# Patient Record
Sex: MALE | Race: WHITE | Employment: OTHER | URBAN - METROPOLITAN AREA
[De-identification: names, ages, dates, MRNs, and addresses within clinical notes are randomized per-mention and may not be internally consistent; named-entity substitution may affect disease eponyms.]

---

## 2017-01-01 ENCOUNTER — HOSPITAL ENCOUNTER (OUTPATIENT)
Facility: HOSPITAL | Age: 67
Setting detail: HOSPITAL OUTPATIENT SURGERY
Discharge: HOME | End: 2017-01-01
Attending: INTERNAL MEDICINE | Admitting: INTERNAL MEDICINE
Payer: MEDICARE

## 2017-01-01 ENCOUNTER — OFFICE VISIT (OUTPATIENT)
Dept: INTERVENTIONAL RADIOLOGY/VASCULAR | Facility: HOSPITAL | Age: 67
End: 2017-01-01
Attending: RADIOLOGY
Payer: MEDICARE

## 2017-01-01 ENCOUNTER — HOSPITAL ENCOUNTER (OUTPATIENT)
Dept: INTERVENTIONAL RADIOLOGY/VASCULAR | Facility: HOSPITAL | Age: 67
Discharge: HOME | End: 2017-01-01
Attending: RADIOLOGY | Admitting: RADIOLOGY
Payer: MEDICARE

## 2017-01-01 ENCOUNTER — APPOINTMENT (OUTPATIENT)
Dept: SURGERY | Facility: CLINIC | Age: 67
End: 2017-01-01

## 2017-01-01 ENCOUNTER — HOSPITAL ENCOUNTER (OUTPATIENT)
Dept: CT IMAGING | Facility: HOSPITAL | Age: 67
Discharge: HOME | End: 2017-01-01
Attending: INTERNAL MEDICINE
Payer: MEDICARE

## 2017-01-01 ENCOUNTER — OFFICE VISIT (OUTPATIENT)
Dept: SURGERY | Facility: CLINIC | Age: 67
End: 2017-01-01

## 2017-01-01 ENCOUNTER — HOSPITAL ENCOUNTER (OUTPATIENT)
Dept: CT IMAGING | Facility: HOSPITAL | Age: 67
Discharge: HOME | End: 2017-01-01
Attending: RADIOLOGY
Payer: MEDICARE

## 2017-01-01 ENCOUNTER — ANESTHESIA (OUTPATIENT)
Dept: INTERVENTIONAL RADIOLOGY/VASCULAR | Facility: HOSPITAL | Age: 67
End: 2017-01-01
Payer: MEDICARE

## 2017-01-01 ENCOUNTER — TELEPHONE (OUTPATIENT)
Dept: SURGERY | Facility: CLINIC | Age: 67
End: 2017-01-01

## 2017-01-01 ENCOUNTER — SURGERY (OUTPATIENT)
Age: 67
End: 2017-01-01

## 2017-01-01 ENCOUNTER — HOSPITAL ENCOUNTER (OUTPATIENT)
Dept: CT IMAGING | Facility: HOSPITAL | Age: 67
Discharge: HOME | End: 2017-01-01
Attending: CLINICAL NURSE SPECIALIST
Payer: MEDICARE

## 2017-01-01 ENCOUNTER — HOSPITAL ENCOUNTER (OUTPATIENT)
Dept: NUCLEAR MEDICINE | Facility: HOSPITAL | Age: 67
Discharge: HOME | End: 2017-01-01
Attending: RADIOLOGY
Payer: MEDICARE

## 2017-01-01 ENCOUNTER — LAB ENCOUNTER (OUTPATIENT)
Dept: LAB | Facility: HOSPITAL | Age: 67
End: 2017-01-01
Attending: RADIOLOGY
Payer: MEDICARE

## 2017-01-01 VITALS
DIASTOLIC BLOOD PRESSURE: 106 MMHG | WEIGHT: 165 LBS | BODY MASS INDEX: 25.9 KG/M2 | SYSTOLIC BLOOD PRESSURE: 148 MMHG | TEMPERATURE: 98 F | HEART RATE: 58 BPM | HEIGHT: 67 IN

## 2017-01-01 VITALS
RESPIRATION RATE: 20 BRPM | OXYGEN SATURATION: 93 % | BODY MASS INDEX: 25.9 KG/M2 | SYSTOLIC BLOOD PRESSURE: 164 MMHG | TEMPERATURE: 98 F | WEIGHT: 165 LBS | DIASTOLIC BLOOD PRESSURE: 100 MMHG | HEIGHT: 67 IN | HEART RATE: 74 BPM

## 2017-01-01 VITALS
SYSTOLIC BLOOD PRESSURE: 128 MMHG | HEART RATE: 80 BPM | OXYGEN SATURATION: 92 % | DIASTOLIC BLOOD PRESSURE: 77 MMHG | RESPIRATION RATE: 22 BRPM

## 2017-01-01 VITALS
RESPIRATION RATE: 21 BRPM | OXYGEN SATURATION: 92 % | DIASTOLIC BLOOD PRESSURE: 60 MMHG | SYSTOLIC BLOOD PRESSURE: 126 MMHG | TEMPERATURE: 98 F | HEART RATE: 70 BPM

## 2017-01-01 DIAGNOSIS — I85.00 IDIOPATHIC ESOPHAGEAL VARICES WITHOUT BLEEDING (HCC): ICD-10-CM

## 2017-01-01 DIAGNOSIS — C22.0 HEPATOCELLULAR CARCINOMA (HCC): Primary | ICD-10-CM

## 2017-01-01 DIAGNOSIS — C22.0 HEPATOCELLULAR CARCINOMA (HCC): ICD-10-CM

## 2017-01-01 DIAGNOSIS — K74.60 CIRRHOSIS OF LIVER WITHOUT ASCITES, UNSPECIFIED HEPATIC CIRRHOSIS TYPE (HCC): Primary | ICD-10-CM

## 2017-01-01 LAB
AFP-TM SERPL-MCNC: 120.5 NG/ML (ref 0–8.9)
AFP-TM SERPL-MCNC: 165.8 NG/ML (ref 0–8.9)
ALBUMIN SERPL BCP-MCNC: 2.7 G/DL (ref 3.5–4.8)
ALBUMIN SERPL BCP-MCNC: 2.8 G/DL (ref 3.5–4.8)
ALBUMIN/GLOB SERPL: 0.5 {RATIO} (ref 1–2)
ALBUMIN/GLOB SERPL: 0.6 {RATIO} (ref 1–2)
ALP SERPL-CCNC: 107 U/L (ref 32–100)
ALP SERPL-CCNC: 80 U/L (ref 32–100)
ALT SERPL-CCNC: 49 U/L (ref 17–63)
ALT SERPL-CCNC: 51 U/L (ref 17–63)
ANION GAP SERPL CALC-SCNC: 8 MMOL/L (ref 0–18)
ANION GAP SERPL CALC-SCNC: 8 MMOL/L (ref 0–18)
AST SERPL-CCNC: 62 U/L (ref 15–41)
AST SERPL-CCNC: 83 U/L (ref 15–41)
BASOPHILS # BLD: 0 K/UL (ref 0–0.2)
BASOPHILS # BLD: 0 K/UL (ref 0–0.2)
BASOPHILS NFR BLD: 0 %
BASOPHILS NFR BLD: 1 %
BILIRUB SERPL-MCNC: 1.3 MG/DL (ref 0.3–1.2)
BILIRUB SERPL-MCNC: 1.4 MG/DL (ref 0.3–1.2)
BUN SERPL-MCNC: 13 MG/DL (ref 8–20)
BUN SERPL-MCNC: 15 MG/DL (ref 8–20)
BUN/CREAT SERPL: 21.3 (ref 10–20)
BUN/CREAT SERPL: 25 (ref 10–20)
CALCIUM SERPL-MCNC: 8.7 MG/DL (ref 8.5–10.5)
CALCIUM SERPL-MCNC: 8.8 MG/DL (ref 8.5–10.5)
CHLORIDE SERPL-SCNC: 101 MMOL/L (ref 95–110)
CHLORIDE SERPL-SCNC: 105 MMOL/L (ref 95–110)
CO2 SERPL-SCNC: 27 MMOL/L (ref 22–32)
CO2 SERPL-SCNC: 29 MMOL/L (ref 22–32)
CREAT BLD-MCNC: 0.6 MG/DL (ref 0.5–1.5)
CREAT SERPL-MCNC: 0.6 MG/DL (ref 0.5–1.5)
CREAT SERPL-MCNC: 0.61 MG/DL (ref 0.5–1.5)
EOSINOPHIL # BLD: 0 K/UL (ref 0–0.7)
EOSINOPHIL # BLD: 0.1 K/UL (ref 0–0.7)
EOSINOPHIL NFR BLD: 1 %
EOSINOPHIL NFR BLD: 2 %
ERYTHROCYTE [DISTWIDTH] IN BLOOD BY AUTOMATED COUNT: 13 % (ref 11–15)
ERYTHROCYTE [DISTWIDTH] IN BLOOD BY AUTOMATED COUNT: 13.4 % (ref 11–15)
GLOBULIN PLAS-MCNC: 4.7 G/DL (ref 2.5–3.7)
GLOBULIN PLAS-MCNC: 5.1 G/DL (ref 2.5–3.7)
GLUCOSE SERPL-MCNC: 108 MG/DL (ref 70–99)
GLUCOSE SERPL-MCNC: 89 MG/DL (ref 70–99)
HCT VFR BLD AUTO: 43.5 % (ref 41–52)
HCT VFR BLD AUTO: 47.1 % (ref 41–52)
HGB BLD-MCNC: 15.2 G/DL (ref 13.5–17.5)
HGB BLD-MCNC: 16.1 G/DL (ref 13.5–17.5)
INR BLD: 1.1 (ref 0.9–1.2)
LYMPHOCYTES # BLD: 0.2 K/UL (ref 1–4)
LYMPHOCYTES # BLD: 0.7 K/UL (ref 1–4)
LYMPHOCYTES NFR BLD: 20 %
LYMPHOCYTES NFR BLD: 7 %
MCH RBC QN AUTO: 33.9 PG (ref 27–32)
MCH RBC QN AUTO: 34.1 PG (ref 27–32)
MCHC RBC AUTO-ENTMCNC: 34.2 G/DL (ref 32–37)
MCHC RBC AUTO-ENTMCNC: 34.8 G/DL (ref 32–37)
MCV RBC AUTO: 98 FL (ref 80–100)
MCV RBC AUTO: 99.1 FL (ref 80–100)
MONOCYTES # BLD: 0.4 K/UL (ref 0–1)
MONOCYTES # BLD: 0.4 K/UL (ref 0–1)
MONOCYTES NFR BLD: 11 %
MONOCYTES NFR BLD: 12 %
NEUTROPHILS # BLD AUTO: 2.3 K/UL (ref 1.8–7.7)
NEUTROPHILS # BLD AUTO: 3 K/UL (ref 1.8–7.7)
NEUTROPHILS NFR BLD: 68 %
NEUTROPHILS NFR BLD: 79 %
OSMOLALITY UR CALC.SUM OF ELEC: 286 MOSM/KG (ref 275–295)
OSMOLALITY UR CALC.SUM OF ELEC: 291 MOSM/KG (ref 275–295)
PLATELET # BLD AUTO: 72 K/UL (ref 140–400)
PLATELET # BLD AUTO: 75 K/UL (ref 140–400)
PLATELET # BLD AUTO: 87 K/UL (ref 140–400)
PMV BLD AUTO: 10.9 FL (ref 7.4–10.3)
PMV BLD AUTO: 10.9 FL (ref 7.4–10.3)
POTASSIUM SERPL-SCNC: 3.5 MMOL/L (ref 3.3–5.1)
POTASSIUM SERPL-SCNC: 4.3 MMOL/L (ref 3.3–5.1)
PROT SERPL-MCNC: 7.4 G/DL (ref 5.9–8.4)
PROT SERPL-MCNC: 7.9 G/DL (ref 5.9–8.4)
PROTHROMBIN TIME: 14 SECONDS (ref 11.8–14.5)
RBC # BLD AUTO: 4.44 M/UL (ref 4.5–5.9)
RBC # BLD AUTO: 4.76 M/UL (ref 4.5–5.9)
SODIUM SERPL-SCNC: 138 MMOL/L (ref 136–144)
SODIUM SERPL-SCNC: 140 MMOL/L (ref 136–144)
WBC # BLD AUTO: 3.4 K/UL (ref 4–11)
WBC # BLD AUTO: 3.8 K/UL (ref 4–11)

## 2017-01-01 PROCEDURE — 78202 LIVER IMAGING WITH VASC FLOW: CPT | Performed by: RADIOLOGY

## 2017-01-01 PROCEDURE — 37243 VASC EMBOLIZE/OCCLUDE ORGAN: CPT

## 2017-01-01 PROCEDURE — B41J1ZZ FLUOROSCOPY OF OTHER LOWER ARTERIES USING LOW OSMOLAR CONTRAST: ICD-10-PCS | Performed by: RADIOLOGY

## 2017-01-01 PROCEDURE — B4141ZZ FLUOROSCOPY OF SUPERIOR MESENTERIC ARTERY USING LOW OSMOLAR CONTRAST: ICD-10-PCS | Performed by: RADIOLOGY

## 2017-01-01 PROCEDURE — 85025 COMPLETE CBC W/AUTO DIFF WBC: CPT | Performed by: RADIOLOGY

## 2017-01-01 PROCEDURE — 36415 COLL VENOUS BLD VENIPUNCTURE: CPT

## 2017-01-01 PROCEDURE — 85610 PROTHROMBIN TIME: CPT

## 2017-01-01 PROCEDURE — 82565 ASSAY OF CREATININE: CPT

## 2017-01-01 PROCEDURE — 80053 COMPREHEN METABOLIC PANEL: CPT | Performed by: RADIOLOGY

## 2017-01-01 PROCEDURE — 74170 CT ABD WO CNTRST FLWD CNTRST: CPT | Performed by: RADIOLOGY

## 2017-01-01 PROCEDURE — 36245 INS CATH ABD/L-EXT ART 1ST: CPT

## 2017-01-01 PROCEDURE — 36247 INS CATH ABD/L-EXT ART 3RD: CPT

## 2017-01-01 PROCEDURE — 82105 ALPHA-FETOPROTEIN SERUM: CPT

## 2017-01-01 PROCEDURE — 79445 NUCLEAR RX INTRA-ARTERIAL: CPT

## 2017-01-01 PROCEDURE — 04L33ZZ OCCLUSION OF HEPATIC ARTERY, PERCUTANEOUS APPROACH: ICD-10-PCS | Performed by: RADIOLOGY

## 2017-01-01 PROCEDURE — 80053 COMPREHEN METABOLIC PANEL: CPT

## 2017-01-01 PROCEDURE — 75726 ARTERY X-RAYS ABDOMEN: CPT

## 2017-01-01 PROCEDURE — 0DJ08ZZ INSPECTION OF UPPER INTESTINAL TRACT, VIA NATURAL OR ARTIFICIAL OPENING ENDOSCOPIC: ICD-10-PCS | Performed by: INTERNAL MEDICINE

## 2017-01-01 PROCEDURE — 75774 ARTERY X-RAY EACH VESSEL: CPT

## 2017-01-01 PROCEDURE — 85049 AUTOMATED PLATELET COUNT: CPT | Performed by: RADIOLOGY

## 2017-01-01 PROCEDURE — 76377 3D RENDER W/INTRP POSTPROCES: CPT

## 2017-01-01 PROCEDURE — 74170 CT ABD WO CNTRST FLWD CNTRST: CPT | Performed by: CLINICAL NURSE SPECIALIST

## 2017-01-01 PROCEDURE — 85025 COMPLETE CBC W/AUTO DIFF WBC: CPT

## 2017-01-01 PROCEDURE — 85610 PROTHROMBIN TIME: CPT | Performed by: RADIOLOGY

## 2017-01-01 PROCEDURE — 71250 CT THORAX DX C-: CPT | Performed by: INTERNAL MEDICINE

## 2017-01-01 PROCEDURE — 82105 ALPHA-FETOPROTEIN SERUM: CPT | Performed by: RADIOLOGY

## 2017-01-01 PROCEDURE — 77790 RADIATION HANDLING: CPT | Performed by: RADIOLOGY

## 2017-01-01 PROCEDURE — B4121ZZ FLUOROSCOPY OF HEPATIC ARTERY USING LOW OSMOLAR CONTRAST: ICD-10-PCS | Performed by: RADIOLOGY

## 2017-01-01 RX ORDER — SODIUM CHLORIDE 9 MG/ML
INJECTION, SOLUTION INTRAVENOUS ONCE
Status: DISCONTINUED | OUTPATIENT
Start: 2017-01-01 | End: 2017-01-01

## 2017-01-01 RX ORDER — HYDROCODONE BITARTRATE AND ACETAMINOPHEN 5; 325 MG/1; MG/1
1 TABLET ORAL AS NEEDED
Status: DISCONTINUED | OUTPATIENT
Start: 2017-01-01 | End: 2017-01-01

## 2017-01-01 RX ORDER — MORPHINE SULFATE 4 MG/ML
4 INJECTION, SOLUTION INTRAMUSCULAR; INTRAVENOUS EVERY 10 MIN PRN
Status: DISCONTINUED | OUTPATIENT
Start: 2017-01-01 | End: 2017-01-01

## 2017-01-01 RX ORDER — HYDROMORPHONE HYDROCHLORIDE 1 MG/ML
0.4 INJECTION, SOLUTION INTRAMUSCULAR; INTRAVENOUS; SUBCUTANEOUS EVERY 5 MIN PRN
Status: DISCONTINUED | OUTPATIENT
Start: 2017-01-01 | End: 2017-01-01

## 2017-01-01 RX ORDER — MORPHINE SULFATE 4 MG/ML
6 INJECTION, SOLUTION INTRAMUSCULAR; INTRAVENOUS EVERY 10 MIN PRN
Status: DISCONTINUED | OUTPATIENT
Start: 2017-01-01 | End: 2017-01-01

## 2017-01-01 RX ORDER — METOCLOPRAMIDE HYDROCHLORIDE 5 MG/ML
INJECTION INTRAMUSCULAR; INTRAVENOUS AS NEEDED
Status: DISCONTINUED | OUTPATIENT
Start: 2017-01-01 | End: 2017-01-01 | Stop reason: SURG

## 2017-01-01 RX ORDER — SODIUM CHLORIDE, SODIUM LACTATE, POTASSIUM CHLORIDE, CALCIUM CHLORIDE 600; 310; 30; 20 MG/100ML; MG/100ML; MG/100ML; MG/100ML
INJECTION, SOLUTION INTRAVENOUS CONTINUOUS
Status: DISCONTINUED | OUTPATIENT
Start: 2017-01-01 | End: 2017-01-01

## 2017-01-01 RX ORDER — ONDANSETRON 2 MG/ML
INJECTION INTRAMUSCULAR; INTRAVENOUS AS NEEDED
Status: DISCONTINUED | OUTPATIENT
Start: 2017-01-01 | End: 2017-01-01 | Stop reason: SURG

## 2017-01-01 RX ORDER — SODIUM CHLORIDE 9 MG/ML
INJECTION, SOLUTION INTRAVENOUS
Status: DISCONTINUED
Start: 2017-01-01 | End: 2017-01-01

## 2017-01-01 RX ORDER — SODIUM CHLORIDE 9 MG/ML
INJECTION, SOLUTION INTRAVENOUS CONTINUOUS PRN
Status: DISCONTINUED | OUTPATIENT
Start: 2017-01-01 | End: 2017-01-01 | Stop reason: SURG

## 2017-01-01 RX ORDER — LIDOCAINE HYDROCHLORIDE 20 MG/ML
INJECTION, SOLUTION EPIDURAL; INFILTRATION; INTRACAUDAL; PERINEURAL
Status: COMPLETED
Start: 2017-01-01 | End: 2017-01-01

## 2017-01-01 RX ORDER — ONDANSETRON 2 MG/ML
4 INJECTION INTRAMUSCULAR; INTRAVENOUS ONCE AS NEEDED
Status: DISCONTINUED | OUTPATIENT
Start: 2017-01-01 | End: 2017-01-01

## 2017-01-01 RX ORDER — NALOXONE HYDROCHLORIDE 0.4 MG/ML
80 INJECTION, SOLUTION INTRAMUSCULAR; INTRAVENOUS; SUBCUTANEOUS AS NEEDED
Status: DISCONTINUED | OUTPATIENT
Start: 2017-01-01 | End: 2017-01-01

## 2017-01-01 RX ORDER — ONDANSETRON 2 MG/ML
INJECTION INTRAMUSCULAR; INTRAVENOUS
Status: COMPLETED
Start: 2017-01-01 | End: 2017-01-01

## 2017-01-01 RX ORDER — MORPHINE SULFATE 4 MG/ML
2 INJECTION, SOLUTION INTRAMUSCULAR; INTRAVENOUS EVERY 10 MIN PRN
Status: DISCONTINUED | OUTPATIENT
Start: 2017-01-01 | End: 2017-01-01

## 2017-01-01 RX ORDER — HYDROMORPHONE HYDROCHLORIDE 1 MG/ML
0.2 INJECTION, SOLUTION INTRAMUSCULAR; INTRAVENOUS; SUBCUTANEOUS EVERY 5 MIN PRN
Status: DISCONTINUED | OUTPATIENT
Start: 2017-01-01 | End: 2017-01-01

## 2017-01-01 RX ORDER — LIDOCAINE HYDROCHLORIDE 10 MG/ML
INJECTION, SOLUTION EPIDURAL; INFILTRATION; INTRACAUDAL; PERINEURAL AS NEEDED
Status: DISCONTINUED | OUTPATIENT
Start: 2017-01-01 | End: 2017-01-01 | Stop reason: SURG

## 2017-01-01 RX ORDER — ONDANSETRON 2 MG/ML
4 INJECTION INTRAMUSCULAR; INTRAVENOUS ONCE
Status: COMPLETED | OUTPATIENT
Start: 2017-01-01 | End: 2017-01-01

## 2017-01-01 RX ORDER — MORPHINE SULFATE 10 MG/ML
6 INJECTION, SOLUTION INTRAMUSCULAR; INTRAVENOUS EVERY 10 MIN PRN
Status: DISCONTINUED | OUTPATIENT
Start: 2017-01-01 | End: 2017-01-01

## 2017-01-01 RX ORDER — SODIUM CHLORIDE 9 MG/ML
INJECTION, SOLUTION INTRAVENOUS
Status: COMPLETED
Start: 2017-01-01 | End: 2017-01-01

## 2017-01-01 RX ORDER — MIDAZOLAM HYDROCHLORIDE 1 MG/ML
INJECTION INTRAMUSCULAR; INTRAVENOUS
Status: DISCONTINUED | OUTPATIENT
Start: 2017-01-01 | End: 2017-01-01

## 2017-01-01 RX ORDER — METOPROLOL TARTRATE 5 MG/5ML
2.5 INJECTION INTRAVENOUS ONCE
Status: DISCONTINUED | OUTPATIENT
Start: 2017-01-01 | End: 2017-01-01

## 2017-01-01 RX ORDER — MORPHINE SULFATE 2 MG/ML
2 INJECTION, SOLUTION INTRAMUSCULAR; INTRAVENOUS EVERY 10 MIN PRN
Status: DISCONTINUED | OUTPATIENT
Start: 2017-01-01 | End: 2017-01-01

## 2017-01-01 RX ORDER — HYDROCODONE BITARTRATE AND ACETAMINOPHEN 5; 325 MG/1; MG/1
2 TABLET ORAL AS NEEDED
Status: DISCONTINUED | OUTPATIENT
Start: 2017-01-01 | End: 2017-01-01

## 2017-01-01 RX ORDER — SODIUM CHLORIDE 9 MG/ML
INJECTION, SOLUTION INTRAVENOUS ONCE
Status: COMPLETED | OUTPATIENT
Start: 2017-01-01 | End: 2017-01-01

## 2017-01-01 RX ORDER — ONDANSETRON 2 MG/ML
INJECTION INTRAMUSCULAR; INTRAVENOUS
Status: DISCONTINUED
Start: 2017-01-01 | End: 2017-01-01

## 2017-01-01 RX ORDER — HYDROMORPHONE HYDROCHLORIDE 1 MG/ML
0.6 INJECTION, SOLUTION INTRAMUSCULAR; INTRAVENOUS; SUBCUTANEOUS EVERY 5 MIN PRN
Status: DISCONTINUED | OUTPATIENT
Start: 2017-01-01 | End: 2017-01-01

## 2017-01-01 RX ADMIN — SODIUM CHLORIDE: 9 INJECTION, SOLUTION INTRAVENOUS at 14:05:00

## 2017-01-01 RX ADMIN — ONDANSETRON 4 MG: 2 INJECTION INTRAMUSCULAR; INTRAVENOUS at 13:18:00

## 2017-01-01 RX ADMIN — SODIUM CHLORIDE: 9 INJECTION, SOLUTION INTRAVENOUS at 11:57:00

## 2017-01-01 RX ADMIN — LIDOCAINE HYDROCHLORIDE 25 MG: 10 INJECTION, SOLUTION EPIDURAL; INFILTRATION; INTRACAUDAL; PERINEURAL at 13:22:00

## 2017-01-01 RX ADMIN — SODIUM CHLORIDE: 9 INJECTION, SOLUTION INTRAVENOUS at 13:18:00

## 2017-01-01 RX ADMIN — METOCLOPRAMIDE HYDROCHLORIDE 10 MG: 5 INJECTION INTRAMUSCULAR; INTRAVENOUS at 10:51:00

## 2017-01-01 RX ADMIN — SODIUM CHLORIDE: 9 INJECTION, SOLUTION INTRAVENOUS at 10:37:00

## 2017-01-01 RX ADMIN — ONDANSETRON 4 MG: 2 INJECTION INTRAMUSCULAR; INTRAVENOUS at 09:34:00

## 2017-01-01 RX ADMIN — SODIUM CHLORIDE: 9 INJECTION, SOLUTION INTRAVENOUS at 12:45:00

## 2017-01-01 RX ADMIN — ONDANSETRON 4 MG: 2 INJECTION INTRAMUSCULAR; INTRAVENOUS at 10:51:00

## 2017-05-21 NOTE — PROGRESS NOTES
[facility]  [address] [facility], [address] [facility] [address]  [address]  [phone] ([phone]) nightly 4 days then increase to 1 tab BID 1 week then 1 tab three times a day, Disp: 90 capsule, Rfl: 3  •  DAILY MULTIPLE VITAMINS Oral Tab, Take 1 tablet by mouth daily. , Disp: , Rfl:   •  aspirin (ASPIRIN LOW DOSE) 81 MG Oral Tab, Take 81 mg by mouth da on scan  - Follow up in 6-8 weeks      Nahum Rogers MD  Director of Hepatology  Medical Director of [facility] of [address]  [address] [place] [address], 7th floor, [place], [place], [address] [place] [address]

## 2017-06-07 NOTE — PROGRESS NOTES
Patient Name: Caty Lin  YOB: 1950  Age: 73year old    Referring Physician: Dr. Derrek Gibbs    Diagnosis: Hepatocellular carcinoma    History of Present Illness:  Mr. Jamie Galo is a 73year old male with a history of hepatitis C cirrhosis, Disp:  Rfl:      No current facility-administered medications on file prior to visit.     Allergies:  No Known Allergies    Review of Systems:    History obtained from wife and chart  General ROS: denies recent weight loss  Ophthalmic ROS: negative  ENT ROS pain and inability to lay supine for prolonged period) within the next 2 weeks.       Darling Stone MD    TOTAL TIME SPENT WITH PATIENT: 35 minutes

## 2017-06-14 NOTE — OPERATIVE REPORT
[address] Patient Status:  Hospital Outpatient Surgery    1950 MRN [id number]   [facility] ENDOSCOPY Attending Uriel Trammell MD   Hosp Day # 0 PCP Jose Antonio Vieyra MD         PATIENT NAME: Juan Marshcecil

## 2017-06-19 NOTE — ANESTHESIA POSTPROCEDURE EVALUATION
Patient: Charles Skipper    Procedure Summary     Date Anesthesia Start Anesthesia Stop Room / Location    06/19/17 [id number] [facility] Interventional Suit[facility]       Procedure Diagnosis Scheduled Providers Responsible Provider    IR Y-90 MAPPING; IR MIS

## 2017-06-19 NOTE — PROCEDURES
[facility] HOSP - [facility]  Procedure Note    Verangy Huynh Patient Status:  Outpatient in a Bed    1950 MRN [id number]   Location [facility] [place] [facility] Attending Shelly Reed MD   Hosp Day # 0 PCP Karyle Cheng, MD     P

## 2017-06-19 NOTE — PROGRESS NOTES
Pt being transferred to Nuclear Med accompanied by Cath Lab Morteza guzman RTR. VSS. Pt awake and responsive.

## 2017-06-19 NOTE — PLAN OF CARE
Pt transferred to chair and tolerated well Liquids taken and tolerated well Site soft dry and intact

## 2017-06-19 NOTE — ANESTHESIA PREPROCEDURE EVALUATION
Anesthesia PreOp Note    HPI:     Alex Morrissey is a 73year old male who presents for preoperative consultation requested by: * No surgeons listed *    Date of Surgery: 6/19/2017    * No procedures listed *  Indication: * No pre-op diagnosis entered *    * week then 1 tab three times a day (Patient taking differently: Take 100 mg by mouth 3 (three) times daily.  1 tab nightly 4 days then increase to 1 tab BID 1 week then 1 tab three times a day ) Disp: 90 capsule Rfl: 3 6/18/2017 at Unknown time   DAILY MULTI ROS/Med Hx and Physical Exam     Patient summary reviewed and Nursing notes reviewed    Airway   Mallampati: I  TM distance: >3 FB  Neck ROM: full  Dental - normal exam     Pulmonary - negative ROS and normal exam   Cardiovascular - normal exam  (+) hypert

## 2017-07-10 NOTE — PROCEDURES
[facility]  Procedure Note    Ruddy Radha Patient Status:  Outpatient in a Bed    1950 MRN [id number]   Location [facility] Attending Emy Carey MD   Hosp Day # 0 PCP Pro Ayaal MD     P

## 2017-07-10 NOTE — ANESTHESIA POSTPROCEDURE EVALUATION
Patient: Colin Ramachandran    Procedure Summary     Date:  07/10/17 Room / Location:  [facility] Interventional Suites    Anesthesia Start:  4934 Anesthesia Stop:      Procedure:  IR Y-90 TREATMENT Diagnosis:  Hepatocellular carcinoma ([facility] [address]    Scheduled

## 2017-07-10 NOTE — ANESTHESIA PREPROCEDURE EVALUATION
Anesthesia PreOp Note    HPI:     Raimundo Huynh is a 73year old male who presents for preoperative consultation requested by: * No surgeons listed *    Date of Surgery: 7/10/2017    * No procedures listed *  Indication: * No pre-op diagnosis entered *    * tab three times a day (Patient taking differently: Take 100 mg by mouth 3 (three) times daily.  1 tab nightly 4 days then increase to 1 tab BID 1 week then 1 tab three times a day ) Disp: 90 capsule Rfl: 3 7/9/2017 at Unknown time   DAILY MULTIPLE VITAMINS Value Date    06/19/2017    04/27/2017   K 3.5 06/19/2017   K 3.9 04/27/2017    06/19/2017    04/27/2017   CO2 27 06/19/2017   CO2 25 04/27/2017   BUN 15 06/19/2017   BUN 13 04/27/2017   CREATSERUM 0.60 06/19/2017   CREATSERUM 0.60

## 2017-07-13 NOTE — H&P
[address] [place] [address] Patient Status:  Outpatient in a Bed    1950 MRN [id number]   Location [facility] Attending No att. providers found   Hosp Day # 0 PCP Noa Romero MD tab three times a day ), Disp: 90 capsule, Rfl: 3  •  DAILY MULTIPLE VITAMINS Oral Tab, Take 1 tablet by mouth daily. , Disp: , Rfl:   •  aspirin (ASPIRIN LOW DOSE) 81 MG Oral Tab, Take 81 mg by mouth nightly.  , Disp: , Rfl:     Physical Exam & Review of S

## 2017-08-28 NOTE — PROGRESS NOTES
[facility]  [address] [facility], [address]  [address]  [phone] ([phone]) HISTORY:  Past Medical History:   Diagnosis Date   • AS (ankylosing spondylitis) (HCC)    • Cerebellar stroke (HCC)    • Cirrhosis (HCC) Non bleeding varices, HCC   • Hearing impairment     [place] bilateral   • Hep C w/o coma, chronic (HCC) Genotype 1a, F arterial   enhancement with peripheral/posterior decreased overall enhancement suggesting some degree of necrosis. There is a punctate 4 mm hyperenhancing lesion in the left hepatic lobe (segment III) as seen on series 7, image 89. This is indeterminate.  Gonzalo Rios There is diffuse osseous demineralization. Mild to moderate compression deformities of the T9, T10, and T11 vertebral bodies are unchanged. LUNG BASES:            The heart is normal in size. There is coronary atherosclerosis.  Left greater t similar in comparison to prior exam.  9. Coronary atherosclerosis. 10. Focal deformity of the left inferior chest wall related to inferior bowing of multiple left anteroinferior costochondral junctions. 11. Nonobstructing left lower pole renal calculi. further evaluated with PET/CT as clinically indicated. 3. Splenomegaly and gastroesophageal varices. 4. Cholelithiasis. 5. Multiple small cystic structures in the pancreas.  These could be followed with a CT in one year  versus further evaluation with MR

## 2017-08-29 NOTE — PROGRESS NOTES
Plan discussed with NUBIA Horner and patient. 73year old with HCV cirrhosis complicated by [address] (~ 9 cm), s/p Y-90 x 2. Last scan with reduced arterial enhancement and AFP decreasing. Tolerated treatment well. Does have non-bleeding varices.     - N

## 2017-08-31 NOTE — TELEPHONE ENCOUNTER
Spoke to patient's wife, Abdirizak Lewis. Clarified patient is taking metoprolol 25mg Er. Instructed to stop taking metoprolol and start taking coreg 6.25 PO BID. Medication eprescribed.

## 2017-10-10 PROBLEM — C22.0 HCC (HEPATOCELLULAR CARCINOMA) (HCC): Status: ACTIVE | Noted: 2017-01-01

## 2017-10-10 PROBLEM — I85.00 ESOPHAGEAL VARICES WITHOUT BLEEDING, UNSPECIFIED ESOPHAGEAL VARICES TYPE (HCC): Status: ACTIVE | Noted: 2017-01-01

## 2017-10-30 NOTE — PROGRESS NOTES
[facility] at [facility]  [address] [facility], [address]  [address]  [phone] ([phone]) polydipsia(-)  Neuro: loss of strength/weakness (+)      HISTORY:       Past Medical History:   Diagnosis Date   • AS (ankylosing spondylitis) (HCC)     • Cerebellar stroke (HCC)     • Cirrhosis (HCC) Non bleeding varices, HCC   • Hearing impairment       hypertension including esophageal, and gastrohepatic ligament varices. 6. 1 cm cystic pancreatic lesion without significant change. 7. Mild splenomegaly with heterogeneous enhancement unchanged. 8. Cholelithiasis unchanged.   9. Atherosclerotic vascular is an indeterminate 1.5 cm left adrenal nodule. KIDNEYS:                    No hydronephrosis is seen. Punctate 2-3 mm non-second calculated noted in the inferior pole of the left kidney. Vascular calcifications present at the hilum bilaterally.  There is followup imaging is recommended. 3. Cirrhosis with mild sequelae of portal hypertension including enlargement of the portal vein and small gastroesophageal varices. 4. Cholelithiasis. 5. Indeterminate subcentimeter hypoenhancing lesion in the spleen.   6 adenopathy.    LIMITED ABDOMEN:               The liver has a lobulated surface contour consistent with cirrhosis. There is a 7.6 x 8.2 x 9.0 cm exophytic mass arising from the posterior right lobe of the liver.   BONES:                       Anterior wedgi negative for metastatic disease.  - Defer HCV treatment given recent diagnosis of [address] and concern about effect of DAA regimen on [address] progression; could reconsider after treatment if [address] shown to be very responsive.  - Consider switching from metoprolol to

## 2017-12-20 NOTE — H&P
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   HCV cirrhosis, HCC, varices screening    See note reviewed from 4/17/17    PROCEDURE: EGD    Allergies: Review of patient's allergies indica Message left

## 2018-01-01 PROCEDURE — 82105 ALPHA-FETOPROTEIN SERUM: CPT | Performed by: INTERNAL MEDICINE

## (undated) DEVICE — ENDOSCOPY PACK UPPER: Brand: MEDLINE INDUSTRIES, INC.

## (undated) DEVICE — 3M™ RED DOT™ MONITORING ELECTRODE WITH FOAM TAPE AND STICKY GEL, 50/BAG, 20/CASE, 72/PLT 2570: Brand: RED DOT™

## (undated) DEVICE — FILTERLINE NASAL ADULT O2/CO2

## (undated) DEVICE — Device: Brand: DEFENDO AIR/WATER/SUCTION AND BIOPSY VALVE

## (undated) DEVICE — 1200CC GUARDIAN II: Brand: GUARDIAN

## (undated) NOTE — Clinical Note
06/07/2017     Chris Gonzalez, [address] [facility]  [address]  [address]          Re:  Army Kaur   YOB: 1950       Dear  Dr. Gertrude Lockett MD,      I had the pleasure of seeing your patient, Army Kaur in Current Outpatient Prescriptions on File Prior to Visit:  gabapentin 100 MG Oral Cap 1 tab nightly 4 days then increase to 1 tab BID 1 week then 1 tab three times a day (Patient taking differently: Take 100 mg by mouth 3 (three) times daily.  1 tab nightl CT chest demonstrates no evidence of metastatic disease. Mr. Hoyt Councilman is a poor candidate for resection given the presence of cirrhosis and portal hypertension. Performance status is ECOG 3.   The patient is a candidate for selective hepatic radioembolizat

## (undated) NOTE — IP AVS SNAPSHOT
[facility] [place] [address] [place], [address] ~ [phone]                Discharge Summary   6/14/2017    Ancil [place]           Admission Information        Provider Department    6/14/2017 Gerber Dempsey MD  Endoscopy      [place] - Do not drink alcohol today. Medication:  - If you have questions about resuming your normal medications, please contact your Primary Care Physician. Activities:  - Take it easy today. Do not return to work today. - Do not drive today.   - Do not op WBC RBC Hemoglobin Hematocrit MCV MCH MCHC RDW Platelet MPV    (92/10/23)  3.6 (04/27/17)  4.64 (04/27/17)  15.7 (04/27/17)  43.6 (04/27/17)  94 -- -- -- (04/27/17)  88 (LL) --      Recent Hematology Lab Results (cont.)  (Last 3 results in the past 90 day Department Dept Phone    6/14/2017 12:51 PM  Endoscopy [phone]         We want to hear from you       We want to hear from you, please share your experience with us by returning the survey you will receive in the mail. Thank you!         [employer]

## (undated) NOTE — IP AVS SNAPSHOT
[address] [phone]                Discharge Summary   6/19/2017    Ancil [place]           Admission Information        Provider Department    6/19/2017 Evans Taylor MD [facility] Inte Patient Instructions       DUE TO SEDATION FOR 24 HOURS-NO DRIVING ALCOHOL OPERATING MACHINERY OR CRITICAL DECISIONS    KEEP SITE DRY FOR 24 HOURS-THEN OK TO REMOVE DRESSING AND SHOWER    NO STRENUOUS ACTIVITY OR LIFTING ABOVE 10 POUNDS FOR 48 HOURS    NO 0.0      Metabolic Lab Results  (Last result in the past 90 days)    HgbA1C Glucose BUN Creatinine Calcium Alkaline Phosph AST    -- (06/19/17)  108 (H) (06/19/17)  15 (06/19/17)  0.60 (06/19/17)  8.7 (06/19/17)  80 (06/19/17)  62 (H)      Metabolic Lab Re For medical emergencies, dial 911.             _____________________________________________________________________________    Medication Side Effects - Medications to be taken at home  As your caregivers, we want you to be aware of the medications you a What to report to your healthcare team: Dizziness, Somnolence, Weakness, Headache, Nausea/vomiting           All Other Medications     DAILY MULTIPLE VITAMINS Oral Tab

## (undated) NOTE — Clinical Note
06/07/2017     Shawna Pham, [address] [facility]  [address]  [address] [facility]         Re:  David Roman   YOB: 1950       Dear  Dr. Dania Rowley MD,      I had the pleasure of seeing your patient, David Roman in Current Outpatient Prescriptions on File Prior to Visit:  gabapentin 100 MG Oral Cap 1 tab nightly 4 days then increase to 1 tab BID 1 week then 1 tab three times a day (Patient taking differently: Take 100 mg by mouth 3 (three) times daily.  1 tab nightl CT chest demonstrates no evidence of metastatic disease. Mr. Santana York is a poor candidate for resection given the presence of cirrhosis and portal hypertension. Performance status is ECOG 3.   The patient is a candidate for selective hepatic radioembolizat

## (undated) NOTE — MR AVS SNAPSHOT
After Visit Summary   6/7/2017    Ligia Boston    MRN: [id number]           Visit Information        Provider Department Dept Phone    6/7/2017  1:00 PM Александр Lamb MD [facility] [phone]      Reason for Visit     Consult view more details from this visit by going to [email or web address]. If you've recently had a stay at the Hospital you can access your discharge instructions in [employer] by going to Visits < Admission Summaries.  If you've been to the Emergency Depar

## (undated) NOTE — LETTER
[facility], [place]  Authorization for Invasive Procedures  1.  I hereby authorize Dr. Georgie Raymond** , my physician and whomever may be designated as the doctor's assistant, to perform the following operation and/or procedure:  *Vi 5. I consent to the photographing of the operations or procedures to be performed for the purposes of advancing medicine, science, and/or education, provided my identity is not revealed.  If the procedure has been videotaped, the physician/surgeon will obta __________ Time: ___________    Statement of Physician  My signature below affirms that prior to the time of the procedure, I have explained to the patient and/or his legal representative, the risks and benefits involved in the proposed treatment and any r

## (undated) NOTE — LETTER
[facility]  Administration of Anesthesia  1. Kaity Maurer, or _________________________________ acting on his/her behalf, (Patient) (Dependent/Representative) request to receive anesthesia for my pending procedure/operation/treatment. pressure, difficulty urinating, slowing of the baby's heart rate and headache. Rare risks include infections, high spinal         block, spinal bleeding, seizure, cardiac arrest and death.   7.   AWARENESS: I understand that it is possible (but unlike ________________________________________________  (Date) (Time)                                                                                                  (Responsible person in case of minor/ unconscious pt) /Relationship    My signature below affir

## (undated) NOTE — LETTER
[facility]  Administration of Anesthesia  1. Laura Turner, or _________________________________ acting on his behalf, (Patient) (Dependent/Representative) request to receive anesthesia for my pending procedure/operation/treatment.   A ph infections, high spinal block, spinal bleeding, seizure, cardiac arrest and death. 7. AWARENESS: I understand that it is possible (but unlikely) to have explicit memory of events from the operating room while under general anesthesia.   8. ELECTROCONVULSIV (Date) (Time)                                                                                               (Responsible person in case of minor/ unconscious pt) /Relationship    My signature below affirms that prior to the time of the procedure, I have ex

## (undated) NOTE — LETTER
[facility], [place]  Authorization for Invasive Procedures  1.  I hereby authorize Dr. Tari Galicia** , my physician and whomever may be designated as the doctor's assistant, to perform the following operation and/or procedure:  *Vi 5. I consent to the photographing of the operations or procedures to be performed for the purposes of advancing medicine, science, and/or education, provided my identity is not revealed.  If the procedure has been videotaped, the physician/surgeon will obta __________ Time: ___________    Statement of Physician  My signature below affirms that prior to the time of the procedure, I have explained to the patient and/or his legal representative, the risks and benefits involved in the proposed treatment and any r

## (undated) NOTE — MR AVS SNAPSHOT
[facility]  [address]  [address] [place], [address] [phone]                    After Visit Summary   5/22/2017    Tim Zacarisa    MRN: [id number]           Visit Information        Provider Department Dept Phone    5/22/20 Summaries. If you've been to the Emergency Department or your doctor's office, you can view your past visit information in [facility] by going to Visits < Visit Summaries. [facility] questions? Call [phone] for help.   [facility] is NOT to be used for urge